# Patient Record
(demographics unavailable — no encounter records)

---

## 2025-06-14 NOTE — HISTORY OF PRESENT ILLNESS
[de-identified] : 47-year-old male, presents for his annual health maintenance and physical examination. He has a history of slightly elevated prolactin levels, with previous tests showing an increase from 43.1 to 47.8 between March and December 2023. An MRI in 2023 did not reveal any pituitary abnormalities. Mr. Lazarus expresses a desire to discontinue his blood pressure medication (Losartan 25mg) and is interested in exploring alternatives. He reports experiencing occasional dizziness and cognitive fog, which he attributes to his blood pressure. Mr. Lazarus has also noticed some memory issues, particularly in recalling recent conversations and places. He has gained weight, currently at 219 lbs, up from 208 lbs in February 2024. Despite the weight gain, he maintains an active lifestyle, exercising at the gym 4-5 times a week. Mr. Lazarus reports high stress levels, primarily related to work and raising three teenage daughters. He also mentions experiencing leg discomfort, particularly after long motorcycle rides. 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 Exercise: Goes to the gym 4-5 times a week. Diet: Low salt intake, no added salt in cooking. Alcohol: Reduced to approximately 3 times a month. Non-smoker. Caffeine: High intake, but has reduced consumption.